# Patient Record
Sex: MALE | Employment: FULL TIME | URBAN - METROPOLITAN AREA
[De-identification: names, ages, dates, MRNs, and addresses within clinical notes are randomized per-mention and may not be internally consistent; named-entity substitution may affect disease eponyms.]

---

## 2024-09-13 ENCOUNTER — OFFICE VISIT (OUTPATIENT)
Age: 44
End: 2024-09-13

## 2024-09-13 VITALS
OXYGEN SATURATION: 98 % | BODY MASS INDEX: 27.84 KG/M2 | HEART RATE: 67 BPM | HEIGHT: 77 IN | SYSTOLIC BLOOD PRESSURE: 127 MMHG | WEIGHT: 235.8 LBS | DIASTOLIC BLOOD PRESSURE: 78 MMHG | TEMPERATURE: 98.4 F | RESPIRATION RATE: 16 BRPM

## 2024-09-13 DIAGNOSIS — Z91.038 ALLERGIC REACTION TO INSECT BITE: Primary | ICD-10-CM

## 2024-09-13 RX ORDER — DEXAMETHASONE SODIUM PHOSPHATE 10 MG/ML
10 INJECTION, SOLUTION INTRAMUSCULAR; INTRAVENOUS ONCE
Status: COMPLETED | OUTPATIENT
Start: 2024-09-13 | End: 2024-09-13

## 2024-09-13 RX ADMIN — DEXAMETHASONE SODIUM PHOSPHATE 10 MG: 10 INJECTION, SOLUTION INTRAMUSCULAR; INTRAVENOUS at 17:37

## 2024-09-16 ENCOUNTER — OFFICE VISIT (OUTPATIENT)
Age: 44
End: 2024-09-16

## 2024-09-16 VITALS
WEIGHT: 235.45 LBS | HEIGHT: 77 IN | RESPIRATION RATE: 16 BRPM | HEART RATE: 65 BPM | TEMPERATURE: 98.5 F | OXYGEN SATURATION: 97 % | DIASTOLIC BLOOD PRESSURE: 77 MMHG | BODY MASS INDEX: 27.8 KG/M2 | SYSTOLIC BLOOD PRESSURE: 125 MMHG

## 2024-09-16 DIAGNOSIS — Z91.038 ALLERGIC REACTION TO INSECT BITE: Primary | ICD-10-CM

## 2024-09-16 RX ORDER — DEXAMETHASONE SODIUM PHOSPHATE 10 MG/ML
10 INJECTION, SOLUTION INTRAMUSCULAR; INTRAVENOUS ONCE
Status: COMPLETED | OUTPATIENT
Start: 2024-09-16 | End: 2024-09-16

## 2024-09-16 RX ORDER — ONDANSETRON 4 MG/1
4 TABLET, ORALLY DISINTEGRATING ORAL ONCE
Status: COMPLETED | OUTPATIENT
Start: 2024-09-16 | End: 2024-09-16

## 2024-09-16 RX ADMIN — DEXAMETHASONE SODIUM PHOSPHATE 10 MG: 10 INJECTION, SOLUTION INTRAMUSCULAR; INTRAVENOUS at 10:42

## 2024-09-16 RX ADMIN — ONDANSETRON 4 MG: 4 TABLET, ORALLY DISINTEGRATING ORAL at 10:43

## 2025-01-17 ENCOUNTER — OFFICE VISIT (OUTPATIENT)
Age: 45
End: 2025-01-17

## 2025-01-17 VITALS
TEMPERATURE: 98.4 F | OXYGEN SATURATION: 98 % | WEIGHT: 238.2 LBS | BODY MASS INDEX: 28.13 KG/M2 | RESPIRATION RATE: 16 BRPM | SYSTOLIC BLOOD PRESSURE: 132 MMHG | HEART RATE: 71 BPM | HEIGHT: 77 IN | DIASTOLIC BLOOD PRESSURE: 66 MMHG

## 2025-01-17 DIAGNOSIS — J06.9 UPPER RESPIRATORY TRACT INFECTION, UNSPECIFIED TYPE: ICD-10-CM

## 2025-01-17 DIAGNOSIS — H10.32 ACUTE BACTERIAL CONJUNCTIVITIS OF LEFT EYE: Primary | ICD-10-CM

## 2025-01-17 RX ORDER — ERYTHROMYCIN 5 MG/G
OINTMENT OPHTHALMIC
Qty: 1 EACH | Refills: 0 | Status: SHIPPED | OUTPATIENT
Start: 2025-01-17

## 2025-01-17 NOTE — PROGRESS NOTES
Louie Strickland (:  1980) is a 44 y.o. male,Established patient, here for evaluation of the following chief complaint(s):  Eye Problem (Pt presents to clinic w/ c/o LT eye redness and discharge. Pt reports sx started 2025 in the evening. States he has had cold sx over the last week. Pt denies OTC usage)        SUBJECTIVE/OBJECTIVE:    History provided by:  Patient  Eye Problem          44 y.o. male presents with symptoms of left eye redness and discharge.  Started yesterday.  Admits to recently a little sore throat in the morning or at night, not painful to swallow, went away.  Some nasal congestion but no sinus pain or pressure.  Last night red left eye and woke with crusted discharge, irritated.  Feels dry and making it blurry but minor.  No vision loss or double vision.  He does not wear contact lenses.  Reading glasses occasionally.        Vitals:    25 1018   BP: 132/66   Site: Left Upper Arm   Position: Sitting   Cuff Size: Large Adult   Pulse: 71   Resp: 16   Temp: 98.4 °F (36.9 °C)   TempSrc: Oral   SpO2: 98%   Weight: 108 kg (238 lb 3.2 oz)   Height: 1.956 m (6' 5\")       No results found for this visit on 25.     Physical Exam  Constitutional:       General: He is not in acute distress.     Appearance: Normal appearance. He is not ill-appearing or toxic-appearing.   HENT:      Head: Normocephalic and atraumatic.      Right Ear: Tympanic membrane, ear canal and external ear normal.      Left Ear: Tympanic membrane, ear canal and external ear normal.      Nose: Nose normal.      Mouth/Throat:      Mouth: Mucous membranes are moist.      Pharynx: No oropharyngeal exudate or posterior oropharyngeal erythema.   Eyes:      General:         Right eye: No discharge.         Left eye: No discharge.      Conjunctiva/sclera:      Right eye: Right conjunctiva is not injected. No chemosis, exudate or hemorrhage.     Left eye: Left conjunctiva is injected. No chemosis, exudate or hemorrhage.

## 2025-01-17 NOTE — PATIENT INSTRUCTIONS
Thank you for visiting [facility] today    Left eye conjunctivitis -  Erythromycin eye ointment, apply a thin layer to the lower lid 4 times daily for 7 days  Recommend frequent handwashing  Call or return to clinic if no improvement or any worsening  If any significant worsening go to the nearest emergency room    Upper respiratory infection -  Please see below for over the counter symptomatic treatment recommendations:    Nasal Congestion:  Steroid nasal spray, such as Flonase, Nasonex, or Nasacort  Normal saline nasal spray  Afrin nasal spray no longer than three days  Oral decongestants, such as Sudafed/pseudoephedrine  Do not take if you have a history of high blood pressure, take Coricidin HBP (or the generic form) instead. Follow instructions on the box  Cough:  Throat lozenges, hot tea, and honey may help  Vicks VapoRub at night to help with cough and relieve muscles aches and pain  If not prescribed a cough medication, Robitussin DM is an option. It is an over the counter cough medication containing dextromethorphan to help suppress cough at night   *Please only take when absolutely needed, as this is a controlled substance that can cause addiction   *Please only take cough syrup at nighttime as it causes drowsiness   *Do not drive or operate any machinery while taking this medication  Chest Congestion:  For thick mucus, take Mucinex (with Guafenesin only) to help thin the mucus. Follow instructions on the box. You will need to drink plenty of water with this medication  Sore Throat:  Lozenges, as needed. Cepacol lozenges will help numb the throat  Chloraseptic spray also helps to numb throat pain  Salt water gargles (1/4 tsp salt in 8 oz of water) to soothe throat pain  Sinus pain/pressure:  Warm, wet towel on face to help with facial sinus pain/pressure  Headache/Pain Fever/Body Aches:  If you can take NSAIDs, take Ibuprofen 400-600 mg every 8 hours as needed  If you cannot take NSAIDs, take

## 2025-05-25 ENCOUNTER — OFFICE VISIT (OUTPATIENT)
Age: 45
End: 2025-05-25

## 2025-05-25 VITALS
TEMPERATURE: 98.1 F | HEIGHT: 77 IN | WEIGHT: 231.2 LBS | SYSTOLIC BLOOD PRESSURE: 116 MMHG | DIASTOLIC BLOOD PRESSURE: 74 MMHG | BODY MASS INDEX: 27.3 KG/M2 | HEART RATE: 78 BPM | RESPIRATION RATE: 18 BRPM | OXYGEN SATURATION: 99 %

## 2025-05-25 DIAGNOSIS — L23.7 POISON IVY: Primary | ICD-10-CM

## 2025-05-25 RX ORDER — ESCITALOPRAM OXALATE 10 MG/1
TABLET ORAL
COMMUNITY

## 2025-05-25 RX ORDER — PREDNISONE 20 MG/1
TABLET ORAL
Qty: 28 TABLET | Refills: 0 | Status: SHIPPED | OUTPATIENT
Start: 2025-05-25 | End: 2025-06-10

## 2025-05-25 RX ORDER — OMEPRAZOLE 20 MG/1
20 CAPSULE, DELAYED RELEASE ORAL DAILY
COMMUNITY
Start: 2025-03-17

## 2025-05-25 RX ORDER — SODIUM PICOSULFATE, MAGNESIUM OXIDE, AND ANHYDROUS CITRIC ACID 12; 3.5; 1 G/175ML; G/175ML; MG/175ML
LIQUID ORAL
COMMUNITY
Start: 2025-02-24

## 2025-05-25 RX ORDER — PROPRANOLOL HCL 20 MG
TABLET ORAL
COMMUNITY

## 2025-05-25 RX ORDER — TADALAFIL 2.5 MG/1
TABLET ORAL
COMMUNITY

## 2025-05-25 RX ORDER — FLUTICASONE PROPIONATE 50 MCG
SPRAY, SUSPENSION (ML) NASAL
COMMUNITY

## 2025-06-01 ENCOUNTER — OFFICE VISIT (OUTPATIENT)
Age: 45
End: 2025-06-01

## 2025-06-01 VITALS
TEMPERATURE: 98.3 F | WEIGHT: 234.6 LBS | HEART RATE: 58 BPM | BODY MASS INDEX: 27.7 KG/M2 | RESPIRATION RATE: 13 BRPM | OXYGEN SATURATION: 98 % | DIASTOLIC BLOOD PRESSURE: 75 MMHG | HEIGHT: 77 IN | SYSTOLIC BLOOD PRESSURE: 125 MMHG

## 2025-06-01 DIAGNOSIS — L23.7 POISON IVY DERMATITIS: Primary | ICD-10-CM

## 2025-06-01 RX ORDER — HYDROXYZINE HYDROCHLORIDE 25 MG/1
25 TABLET, FILM COATED ORAL EVERY 8 HOURS PRN
Qty: 30 TABLET | Refills: 0 | Status: SHIPPED | OUTPATIENT
Start: 2025-06-01 | End: 2025-06-11

## 2025-06-01 RX ORDER — TRIAMCINOLONE ACETONIDE 1 MG/G
CREAM TOPICAL 2 TIMES DAILY
Qty: 80 G | Refills: 1 | Status: SHIPPED | OUTPATIENT
Start: 2025-06-01

## 2025-06-01 RX ORDER — DOXYCYCLINE HYCLATE 100 MG
100 TABLET ORAL 2 TIMES DAILY
Qty: 10 TABLET | Refills: 0 | Status: SHIPPED | OUTPATIENT
Start: 2025-06-01 | End: 2025-06-06

## 2025-06-01 RX ORDER — DEXAMETHASONE SODIUM PHOSPHATE 10 MG/ML
10 INJECTION, SOLUTION INTRA-ARTICULAR; INTRALESIONAL; INTRAMUSCULAR; INTRAVENOUS; SOFT TISSUE ONCE
Status: COMPLETED | OUTPATIENT
Start: 2025-06-01 | End: 2025-06-01

## 2025-06-01 RX ORDER — DIPHENHYDRAMINE HYDROCHLORIDE 50 MG/ML
25 INJECTION, SOLUTION INTRAMUSCULAR; INTRAVENOUS ONCE
Status: DISCONTINUED | OUTPATIENT
Start: 2025-06-01 | End: 2025-06-01

## 2025-06-01 RX ORDER — DIPHENHYDRAMINE HYDROCHLORIDE 50 MG/ML
25 INJECTION, SOLUTION INTRAMUSCULAR; INTRAVENOUS ONCE
Status: COMPLETED | OUTPATIENT
Start: 2025-06-01 | End: 2025-06-01

## 2025-06-01 RX ORDER — PREDNISONE 20 MG/1
TABLET ORAL
Qty: 10 TABLET | Refills: 0 | Status: SHIPPED | OUTPATIENT
Start: 2025-06-01

## 2025-06-01 RX ADMIN — DIPHENHYDRAMINE HYDROCHLORIDE 25 MG: 50 INJECTION, SOLUTION INTRAMUSCULAR; INTRAVENOUS at 09:24

## 2025-06-01 RX ADMIN — DEXAMETHASONE SODIUM PHOSPHATE 10 MG: 10 INJECTION, SOLUTION INTRA-ARTICULAR; INTRALESIONAL; INTRAMUSCULAR; INTRAVENOUS; SOFT TISSUE at 09:20

## 2025-06-28 ENCOUNTER — OFFICE VISIT (OUTPATIENT)
Age: 45
End: 2025-06-28

## 2025-06-28 VITALS
SYSTOLIC BLOOD PRESSURE: 122 MMHG | OXYGEN SATURATION: 97 % | BODY MASS INDEX: 28.46 KG/M2 | WEIGHT: 241 LBS | HEART RATE: 70 BPM | DIASTOLIC BLOOD PRESSURE: 79 MMHG | TEMPERATURE: 98.2 F | HEIGHT: 77 IN | RESPIRATION RATE: 16 BRPM

## 2025-06-28 DIAGNOSIS — S30.860A INSECT BITE OF LOWER BACK, INITIAL ENCOUNTER: Primary | ICD-10-CM
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
## 2025-06-28 RX ORDER — METHYLPREDNISOLONE 4 MG/1
TABLET ORAL
Qty: 21 KIT | Refills: 0 | Status: SHIPPED | OUTPATIENT
Start: 2025-06-28

## 2025-06-28 RX ORDER — DEXAMETHASONE SODIUM PHOSPHATE 10 MG/ML
10 INJECTION, SOLUTION INTRAMUSCULAR; INTRAVENOUS ONCE
Status: COMPLETED | OUTPATIENT
Start: 2025-06-28 | End: 2025-06-28

## 2025-06-28 RX ADMIN — DEXAMETHASONE SODIUM PHOSPHATE 10 MG: 10 INJECTION, SOLUTION INTRAMUSCULAR; INTRAVENOUS at 08:44

## 2025-06-28 ASSESSMENT — ENCOUNTER SYMPTOMS
GASTROINTESTINAL NEGATIVE: 1
EYES NEGATIVE: 1
ALLERGIC/IMMUNOLOGIC NEGATIVE: 1
RESPIRATORY NEGATIVE: 1

## 2025-09-05 ENCOUNTER — OFFICE VISIT (OUTPATIENT)
Age: 45
End: 2025-09-05

## 2025-09-05 VITALS
HEIGHT: 77 IN | SYSTOLIC BLOOD PRESSURE: 117 MMHG | HEART RATE: 77 BPM | RESPIRATION RATE: 16 BRPM | WEIGHT: 229 LBS | OXYGEN SATURATION: 98 % | TEMPERATURE: 98.4 F | BODY MASS INDEX: 27.04 KG/M2 | DIASTOLIC BLOOD PRESSURE: 78 MMHG

## 2025-09-05 DIAGNOSIS — H92.03 OTALGIA OF BOTH EARS: Primary | ICD-10-CM

## 2025-09-05 RX ORDER — CETIRIZINE HYDROCHLORIDE 10 MG/1
CAPSULE, LIQUID FILLED ORAL
COMMUNITY
End: 2025-09-05

## 2025-09-05 RX ORDER — FLUTICASONE PROPIONATE 50 MCG
2 SPRAY, SUSPENSION (ML) NASAL DAILY
Qty: 16 G | Refills: 0 | Status: SHIPPED | OUTPATIENT
Start: 2025-09-05

## 2025-09-05 RX ORDER — ACETAMINOPHEN 500 MG
TABLET ORAL
COMMUNITY
End: 2025-09-05

## 2025-09-05 RX ORDER — GLYCOPYRROLATE 1 MG/1
TABLET ORAL
COMMUNITY

## 2025-09-05 ASSESSMENT — ENCOUNTER SYMPTOMS
SINUS PRESSURE: 0
SINUS PAIN: 0